# Patient Record
Sex: MALE | Race: WHITE | NOT HISPANIC OR LATINO | URBAN - METROPOLITAN AREA
[De-identification: names, ages, dates, MRNs, and addresses within clinical notes are randomized per-mention and may not be internally consistent; named-entity substitution may affect disease eponyms.]

---

## 2018-07-05 ENCOUNTER — EMERGENCY (EMERGENCY)
Facility: HOSPITAL | Age: 33
LOS: 1 days | Discharge: LEFT BEFORE DISCHARGE | End: 2018-07-05
Payer: MEDICAID

## 2018-07-05 VITALS
SYSTOLIC BLOOD PRESSURE: 155 MMHG | RESPIRATION RATE: 19 BRPM | OXYGEN SATURATION: 98 % | DIASTOLIC BLOOD PRESSURE: 67 MMHG | HEIGHT: 64 IN | WEIGHT: 179.9 LBS | TEMPERATURE: 99 F | HEART RATE: 89 BPM

## 2018-07-05 PROCEDURE — 93005 ELECTROCARDIOGRAM TRACING: CPT

## 2018-07-05 PROCEDURE — 93010 ELECTROCARDIOGRAM REPORT: CPT

## 2018-07-05 NOTE — ED ADULT TRIAGE NOTE - CHIEF COMPLAINT QUOTE
patient states that he is having a heart attack, palpitations chest pain, Drank "Redline extreme" energy drink quickly thinking it ws water, no cardiac HX

## 2022-02-26 ENCOUNTER — EMERGENCY (EMERGENCY)
Facility: HOSPITAL | Age: 37
LOS: 1 days | Discharge: HOME | End: 2022-02-26
Attending: EMERGENCY MEDICINE
Payer: MEDICAID

## 2022-02-26 VITALS
TEMPERATURE: 98 F | HEART RATE: 81 BPM | SYSTOLIC BLOOD PRESSURE: 139 MMHG | RESPIRATION RATE: 18 BRPM | HEIGHT: 64 IN | OXYGEN SATURATION: 99 % | DIASTOLIC BLOOD PRESSURE: 83 MMHG

## 2022-02-26 VITALS — TEMPERATURE: 98 F

## 2022-02-26 LAB
ALBUMIN SERPL ELPH-MCNC: 4.3 G/DL — SIGNIFICANT CHANGE UP (ref 3.3–5.2)
ALP SERPL-CCNC: 57 U/L — SIGNIFICANT CHANGE UP (ref 40–120)
ALT FLD-CCNC: 14 U/L — SIGNIFICANT CHANGE UP
ANION GAP SERPL CALC-SCNC: 14 MMOL/L — SIGNIFICANT CHANGE UP (ref 5–17)
AST SERPL-CCNC: 20 U/L — SIGNIFICANT CHANGE UP
BASOPHILS # BLD AUTO: 0.01 K/UL — SIGNIFICANT CHANGE UP (ref 0–0.2)
BASOPHILS NFR BLD AUTO: 0.2 % — SIGNIFICANT CHANGE UP (ref 0–2)
BILIRUB SERPL-MCNC: 1 MG/DL — SIGNIFICANT CHANGE UP (ref 0.4–2)
BUN SERPL-MCNC: 18.5 MG/DL — SIGNIFICANT CHANGE UP (ref 8–20)
CALCIUM SERPL-MCNC: 9.2 MG/DL — SIGNIFICANT CHANGE UP (ref 8.6–10.2)
CHLORIDE SERPL-SCNC: 100 MMOL/L — SIGNIFICANT CHANGE UP (ref 98–107)
CO2 SERPL-SCNC: 23 MMOL/L — SIGNIFICANT CHANGE UP (ref 22–29)
CREAT SERPL-MCNC: 0.91 MG/DL — SIGNIFICANT CHANGE UP (ref 0.5–1.3)
EOSINOPHIL # BLD AUTO: 0.12 K/UL — SIGNIFICANT CHANGE UP (ref 0–0.5)
EOSINOPHIL NFR BLD AUTO: 2.4 % — SIGNIFICANT CHANGE UP (ref 0–6)
GLUCOSE SERPL-MCNC: 113 MG/DL — HIGH (ref 70–99)
HCT VFR BLD CALC: 42.1 % — SIGNIFICANT CHANGE UP (ref 39–50)
HGB BLD-MCNC: 13.5 G/DL — SIGNIFICANT CHANGE UP (ref 13–17)
IMM GRANULOCYTES NFR BLD AUTO: 0.4 % — SIGNIFICANT CHANGE UP (ref 0–1.5)
LYMPHOCYTES # BLD AUTO: 1.69 K/UL — SIGNIFICANT CHANGE UP (ref 1–3.3)
LYMPHOCYTES # BLD AUTO: 33.8 % — SIGNIFICANT CHANGE UP (ref 13–44)
MCHC RBC-ENTMCNC: 25 PG — LOW (ref 27–34)
MCHC RBC-ENTMCNC: 32.1 GM/DL — SIGNIFICANT CHANGE UP (ref 32–36)
MCV RBC AUTO: 77.8 FL — LOW (ref 80–100)
MONOCYTES # BLD AUTO: 0.54 K/UL — SIGNIFICANT CHANGE UP (ref 0–0.9)
MONOCYTES NFR BLD AUTO: 10.8 % — SIGNIFICANT CHANGE UP (ref 2–14)
NEUTROPHILS # BLD AUTO: 2.62 K/UL — SIGNIFICANT CHANGE UP (ref 1.8–7.4)
NEUTROPHILS NFR BLD AUTO: 52.4 % — SIGNIFICANT CHANGE UP (ref 43–77)
PLATELET # BLD AUTO: 177 K/UL — SIGNIFICANT CHANGE UP (ref 150–400)
POTASSIUM SERPL-MCNC: 4.2 MMOL/L — SIGNIFICANT CHANGE UP (ref 3.5–5.3)
POTASSIUM SERPL-SCNC: 4.2 MMOL/L — SIGNIFICANT CHANGE UP (ref 3.5–5.3)
PROT SERPL-MCNC: 6.8 G/DL — SIGNIFICANT CHANGE UP (ref 6.6–8.7)
RAPID RVP RESULT: SIGNIFICANT CHANGE UP
RBC # BLD: 5.41 M/UL — SIGNIFICANT CHANGE UP (ref 4.2–5.8)
RBC # FLD: 13.8 % — SIGNIFICANT CHANGE UP (ref 10.3–14.5)
SARS-COV-2 RNA SPEC QL NAA+PROBE: SIGNIFICANT CHANGE UP
SODIUM SERPL-SCNC: 137 MMOL/L — SIGNIFICANT CHANGE UP (ref 135–145)
TSH SERPL-MCNC: 1.85 UIU/ML — SIGNIFICANT CHANGE UP (ref 0.27–4.2)
WBC # BLD: 5 K/UL — SIGNIFICANT CHANGE UP (ref 3.8–10.5)
WBC # FLD AUTO: 5 K/UL — SIGNIFICANT CHANGE UP (ref 3.8–10.5)

## 2022-02-26 PROCEDURE — 84443 ASSAY THYROID STIM HORMONE: CPT

## 2022-02-26 PROCEDURE — 85025 COMPLETE CBC W/AUTO DIFF WBC: CPT

## 2022-02-26 PROCEDURE — 99284 EMERGENCY DEPT VISIT MOD MDM: CPT

## 2022-02-26 PROCEDURE — 0225U NFCT DS DNA&RNA 21 SARSCOV2: CPT

## 2022-02-26 PROCEDURE — 71045 X-RAY EXAM CHEST 1 VIEW: CPT | Mod: 26

## 2022-02-26 PROCEDURE — 80053 COMPREHEN METABOLIC PANEL: CPT

## 2022-02-26 PROCEDURE — 99285 EMERGENCY DEPT VISIT HI MDM: CPT | Mod: 25

## 2022-02-26 PROCEDURE — 71045 X-RAY EXAM CHEST 1 VIEW: CPT

## 2022-02-26 PROCEDURE — 93010 ELECTROCARDIOGRAM REPORT: CPT

## 2022-02-26 PROCEDURE — 36415 COLL VENOUS BLD VENIPUNCTURE: CPT

## 2022-02-26 PROCEDURE — 93005 ELECTROCARDIOGRAM TRACING: CPT

## 2022-02-26 RX ORDER — HYDROXYZINE HCL 10 MG
1 TABLET ORAL
Qty: 10 | Refills: 0
Start: 2022-02-26 | End: 2022-03-07

## 2022-02-26 NOTE — ED PROVIDER NOTE - OBJECTIVE STATEMENT
Patient is a 35 yo male with no significant PMHx presenting from home with dry mouth, postnasal drip, anxiety, and palpitations. Patient states he has had a month of postnasal drip for which he has seen his PCP several times; he recently finished a course of Augmentin for his symptoms. Patient woke up at 3 am tonight with dry mouth, continued postnasal drip, and palpitations; he takes Ambien at night for sleep but only got 3 hours tonight which concerned him. Patient is feeling anxious about his continued postnasal drip which has not improved with abx or flonase. He denies any alcohol, smoking, or drug use. Denies SI/HI/hallucinations/delusions. Denies any PMHx, cardiac history, family history of early cardiac disease. Patient also states he feels cold and is actively shivering. He is vaccinated x2 and tested negative for COVID last month. Denies fevers, chills, dizziness, lightheadedness  dysphagia, dysarthria, diplopia, photophobia, syncope, cough, SOB, CP, abdominal pain, neck pain, back pain, diarrhea, dysuria, hematuria, hematochezia, hematemesis, n/v, recent travel, sick contacts, leg swelling.

## 2022-02-26 NOTE — ED PROVIDER NOTE - PROGRESS NOTE DETAILS
JK - cbc, cmp, tsh, CXR, ECG all WNL. Patient stable, resting comfortably, VSS in no apparent distress. Appears calm at this time. Hydroxizine scripts sent to pharmacy for anxiety, insomnia; instructed to follow up with his PCP for anxiety if symptoms continue. Patient ready and agreeable to DC home with close outpatient ENT follow up.

## 2022-02-26 NOTE — ED ADULT NURSE NOTE - OBJECTIVE STATEMENT
Pt reports to the ED for palpitations, anxiety, post nasal drip and dry mouth. PT states he only slept for a few hours after taking Ambien. Pt is in no distress. Pt denies chest pain/disomfort at this time. Pt resting comfortably. Pt states he completed ABX but still has post nasal drip. VSS

## 2022-02-26 NOTE — ED PROVIDER NOTE - ATTENDING CONTRIBUTION TO CARE
37 yo M with no PMH presents to ED feeling anxious with assoc palpitations.  Pt has had, dry mouth and post-nasal drip x last 1 1/2 months.  Pt has been treated with Abx x 2 by PMD and then was concerned about having thrush. No improvement with Flonase.  Pt started on Ambien because of being unable to sleep, but still unable to sleep.  On exam pt awake and alert, anxious appearing, NSR on monitor, PERRL, throat clear mm dry, no oral lesions, Neck supple, Cor Reg, Lungs clear b/l, abd soft, NT, Ext FROM, Neuro non-focal, Will check labs, EKG, RVP, CXR and refer to ENT as outpt if ED w/u neg  Rx Vistaril for sleep

## 2022-02-26 NOTE — ED PROVIDER NOTE - CARDIAC, MLM
Normal rate, regular rhythm.  Heart sounds S1, S2.  No murmurs, rubs or gallops. 2+ peripheral pulses, good capillary refill, no peripheral edema

## 2022-02-26 NOTE — ED ADULT NURSE REASSESSMENT NOTE - NS ED NURSE REASSESS COMMENT FT1
Pt DC by MD. Pt in no distress. PT ambulating to the bathroom without difficulty. VSS. PT ambulated out of the ED. PT has all paperwork.

## 2022-02-26 NOTE — ED PROVIDER NOTE - NSFOLLOWUPINSTRUCTIONS_ED_ALL_ED_FT
WHAT YOU NEED TO KNOW:    Postnasal drip is a condition that causes a large amount of mucus to collect in your throat or nose. It may also be called upper airway cough syndrome because the mucus causes repeated coughing. You may have a sore throat, or throat tissues may swell. This may feel like a lump in your throat. You may also feel like you need to clear your throat often.    DISCHARGE INSTRUCTIONS:    Contact your healthcare provider if:   •You have trouble breathing because of the mucus.      •You have new or worsening symptoms, even with treatment.      •You have signs of an infection, such as yellow or green mucus, or a fever.      •You have questions or concerns about your condition or care.      Medicines:   •Medicines may be given to thin the mucus. You may need to swallow the medicine or use a device to flush your sinuses with liquid squirted into your nose. Nasal sprays may also be needed to keep the tissues in your nose moist. Medicines can also relieve congestion. Allergy medicine may help if your symptoms are caused by seasonal allergies, such as hay fever. You may need medicine to help control GERD.      •Antibiotics may be needed to treat a bacterial infection.      •Take your medicine as directed. Contact your healthcare provider if you think your medicine is not helping or if you have side effects. Tell him or her if you are allergic to any medicine. Keep a list of the medicines, vitamins, and herbs you take. Include the amounts, and when and why you take them. Bring the list or the pill bottles to follow-up visits. Carry your medicine list with you in case of an emergency.      Manage postnasal drip:   •Use a humidifier or vaporizer. Use a cool mist humidifier or a vaporizer to increase air moisture in your home. This may make it easier for you to breathe.       •Drink more liquids as directed. Liquids help keep your air passages moist and help you cough up mucus. Ask how much liquid to drink each day and which liquids are best for you.       •Avoid cold air and dry, heated air. Cold or dry air can trigger postnasal drip. Try to stay inside on cold days, or keep your mouth covered. Do not stay long in areas that have dry, heated air.      •Do not smoke, and avoid secondhand smoke. Nicotine and other chemicals in cigarettes and cigars can irritate your throat and make coughing worse. Ask your healthcare provider for information if you currently smoke and need help to quit. E-cigarettes or smokeless tobacco still contain nicotine. Talk to your healthcare provider before you use these products.       Follow up with your doctor as directed: Write down your questions so you remember to ask them during your visits.

## 2022-02-26 NOTE — ED PROVIDER NOTE - CARE PROVIDER_API CALL
Lam Wagner)  Otolaryngology  [address] [place] [address]  Phone: [phone]  Fax: [phone]  Follow Up Time:

## 2022-02-26 NOTE — ED PROVIDER NOTE - CLINICAL SUMMARY MEDICAL DECISION MAKING FREE TEXT BOX
Patient is a 35 yo male with no significant PMHx presenting from home with dry mouth, postnasal drip, anxiety, and palpitations. Patient states he has had a month of postnasal drip for which he has seen his PCP several times; he recently finished a course of Augmentin for his symptoms. Patient woke up at 3 am tonight with dry mouth, continued postnasal drip, and palpitations. Patient is feeling anxious about his continued postnasal drip which has not improved with abx or flonase. Patient also states he feels cold and is actively shivering. He is vaccinated x2. CBC, CMP, TSH to r/o electrolyte abnormalities and thyroid conditions. ECG WNL. RVP, CXR to r/o URI vs. PNA. Will continue to monitor.

## 2022-02-26 NOTE — ED ADULT TRIAGE NOTE - CHIEF COMPLAINT QUOTE
Pt. complaining of palpitations, dry mouth and anxiety that started tonight. Pt. anxious and shaky in triage

## 2022-02-26 NOTE — ED PROVIDER NOTE - PATIENT PORTAL LINK FT
You can access the FollowMyHealth Patient Portal offered by [facility] by registering at the following website: [email or web address]. By joining [facility]’s FollowMyHealth portal, you will also be able to view your health information using other applications (apps) compatible with our system.

## 2022-02-26 NOTE — ED PROVIDER NOTE - CONSTITUTIONAL, MLM
normal... Patient appears visibly anxious, tearful, awake, alert, oriented to person, place, time/situation and in no apparent distress.

## 2022-08-17 PROBLEM — Z00.00 ENCOUNTER FOR PREVENTIVE HEALTH EXAMINATION: Status: ACTIVE | Noted: 2022-08-17

## 2022-09-02 DIAGNOSIS — Z87.898 PERSONAL HISTORY OF OTHER SPECIFIED CONDITIONS: ICD-10-CM

## 2022-09-02 DIAGNOSIS — Z86.59 PERSONAL HISTORY OF OTHER MENTAL AND BEHAVIORAL DISORDERS: ICD-10-CM

## 2022-09-06 ENCOUNTER — APPOINTMENT (OUTPATIENT)
Dept: CARDIOLOGY | Facility: CLINIC | Age: 37
End: 2022-09-06

## 2023-07-06 ENCOUNTER — OFFICE (OUTPATIENT)
Dept: URBAN - METROPOLITAN AREA CLINIC 12 | Facility: CLINIC | Age: 38
Setting detail: OPHTHALMOLOGY
End: 2023-07-06
Payer: MEDICAID

## 2023-07-06 DIAGNOSIS — H00.14: ICD-10-CM

## 2023-07-06 DIAGNOSIS — H16.223: ICD-10-CM

## 2023-07-06 PROCEDURE — 99213 OFFICE O/P EST LOW 20 MIN: CPT | Performed by: OPHTHALMOLOGY

## 2023-07-06 ASSESSMENT — REFRACTION_MANIFEST
OS_VA1: 20/20
OD_VA1: 20/20
OD_CYLINDER: -0.75
OD_SPHERE: -5.00
OD_CYLINDER: -0.75
OS_SPHERE: -6.50
OD_SPHERE: -5.00
OS_AXIS: 180
OS_SPHERE: -6.25
OD_VA1: 20/20-1
OD_AXIS: 007
OS_VA1: 20/20-1
OD_AXIS: 001
OS_AXIS: 000
OS_CYLINDER: -0.25
OU_VA: 20/20
OS_CYLINDER: SPHERE

## 2023-07-06 ASSESSMENT — SUPERFICIAL PUNCTATE KERATITIS (SPK)
OS_SPK: ABSENT
OD_SPK: ABSENT

## 2023-07-06 ASSESSMENT — REFRACTION_CURRENTRX
OD_VPRISM_DIRECTION: SV
OD_CYLINDER: -0.50
OD_AXIS: 008
OS_AXIS: 000
OS_SPHERE: -6.50
OS_VPRISM_DIRECTION: SV
OS_OVR_VA: 20/
OS_CYLINDER: SPHERE
OD_SPHERE: -5.75
OD_OVR_VA: 20/

## 2023-07-06 ASSESSMENT — VISUAL ACUITY
OS_BCVA: 20/20-
OD_BCVA: 20/25-2

## 2023-07-06 ASSESSMENT — CONFRONTATIONAL VISUAL FIELD TEST (CVF)
OS_FINDINGS: FULL
OD_FINDINGS: FULL

## 2023-07-06 ASSESSMENT — SPHEQUIV_DERIVED
OS_SPHEQUIV: -6.375
OD_SPHEQUIV: -5.375
OD_SPHEQUIV: -5.375

## 2023-07-11 ENCOUNTER — OFFICE (OUTPATIENT)
Dept: URBAN - METROPOLITAN AREA CLINIC 12 | Facility: CLINIC | Age: 38
Setting detail: OPHTHALMOLOGY
End: 2023-07-11
Payer: COMMERCIAL
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
## 2023-07-11 PROCEDURE — BRUDER EYE BRUDER EYE PADS: Performed by: OPHTHALMOLOGY

## 2024-03-06 ENCOUNTER — OFFICE (OUTPATIENT)
Dept: URBAN - METROPOLITAN AREA CLINIC 12 | Facility: CLINIC | Age: 39
Setting detail: OPHTHALMOLOGY
End: 2024-03-06
Payer: MEDICAID

## 2024-03-06 DIAGNOSIS — H16.223: ICD-10-CM

## 2024-03-06 PROCEDURE — 92014 COMPRE OPH EXAM EST PT 1/>: CPT | Performed by: OPHTHALMOLOGY

## 2024-03-06 ASSESSMENT — REFRACTION_CURRENTRX
OS_OVR_VA: 20/
OD_AXIS: 008
OS_SPHERE: -6.50
OD_CYLINDER: -0.50
OS_CYLINDER: SPHERE
OD_SPHERE: -5.75
OS_VPRISM_DIRECTION: SV
OS_AXIS: 000
OD_VPRISM_DIRECTION: SV
OD_OVR_VA: 20/

## 2024-03-06 ASSESSMENT — REFRACTION_MANIFEST
OD_SPHERE: -5.00
OD_CYLINDER: -0.75
OD_SPHERE: -5.00
OS_VA1: 20/20-1
OS_CYLINDER: SPHERE
OD_VA1: 20/20-1
OU_VA: 20/20
OD_VA1: 20/20
OS_VA1: 20/20
OS_AXIS: 180
OS_SPHERE: -6.50
OD_AXIS: 007
OD_AXIS: 001
OS_SPHERE: -6.25
OS_CYLINDER: -0.25
OS_AXIS: 000
OD_CYLINDER: -0.75

## 2024-03-06 ASSESSMENT — SPHEQUIV_DERIVED
OD_SPHEQUIV: -5.375
OD_SPHEQUIV: -5.375
OS_SPHEQUIV: -6.375

## 2025-02-06 ENCOUNTER — OFFICE (OUTPATIENT)
Dept: URBAN - METROPOLITAN AREA CLINIC 100 | Facility: CLINIC | Age: 40
Setting detail: OPHTHALMOLOGY
End: 2025-02-06
Payer: MEDICAID

## 2025-02-06 DIAGNOSIS — H00.14: ICD-10-CM

## 2025-02-06 DIAGNOSIS — H01.004: ICD-10-CM

## 2025-02-06 DIAGNOSIS — H00.11: ICD-10-CM

## 2025-02-06 DIAGNOSIS — H01.001: ICD-10-CM

## 2025-02-06 PROCEDURE — 92285 EXTERNAL OCULAR PHOTOGRAPHY: CPT | Performed by: OPHTHALMOLOGY

## 2025-02-06 PROCEDURE — 92012 INTRM OPH EXAM EST PATIENT: CPT | Performed by: OPHTHALMOLOGY

## 2025-02-06 ASSESSMENT — REFRACTION_AUTOREFRACTION
OD_SPHERE: +0.25
OS_SPHERE: +0.50
OD_AXIS: 040
OD_CYLINDER: -0.25
OS_AXIS: 077
OS_CYLINDER: -0.75

## 2025-02-06 ASSESSMENT — CONFRONTATIONAL VISUAL FIELD TEST (CVF)
OS_FINDINGS: FULL
OD_FINDINGS: FULL

## 2025-02-06 ASSESSMENT — KERATOMETRY
OD_AXISANGLE_DEGREES: 089
OD_K1POWER_DIOPTERS: 40.50
OS_K1POWER_DIOPTERS: 39.75
OD_K2POWER_DIOPTERS: 40.75
OS_K2POWER_DIOPTERS: 40.50
OS_AXISANGLE_DEGREES: 152

## 2025-02-06 ASSESSMENT — VISUAL ACUITY
OD_BCVA: 20/20
OS_BCVA: 20/20

## 2025-02-06 ASSESSMENT — SUPERFICIAL PUNCTATE KERATITIS (SPK)
OD_SPK: ABSENT
OS_SPK: ABSENT

## 2025-02-06 ASSESSMENT — LID EXAM ASSESSMENTS
OS_BLEPHARITIS: 2+
OD_BLEPHARITIS: 2+

## 2025-02-24 ENCOUNTER — RX ONLY (RX ONLY)
Age: 40
End: 2025-02-24

## 2025-02-24 ENCOUNTER — TELEHEALTH-OTHER THEN HOME (OUTPATIENT)
Dept: URBAN - METROPOLITAN AREA CLINIC 44 | Facility: CLINIC | Age: 40
Setting detail: OPHTHALMOLOGY
End: 2025-02-24
Payer: MEDICAID

## 2025-02-24 DIAGNOSIS — H00.11: ICD-10-CM

## 2025-02-24 DIAGNOSIS — G24.5: ICD-10-CM

## 2025-02-24 DIAGNOSIS — H01.004: ICD-10-CM

## 2025-02-24 DIAGNOSIS — H00.14: ICD-10-CM

## 2025-02-24 DIAGNOSIS — H01.001: ICD-10-CM

## 2025-02-24 PROCEDURE — 92012 INTRM OPH EXAM EST PATIENT: CPT | Performed by: OPHTHALMOLOGY

## 2025-02-24 ASSESSMENT — KERATOMETRY
OD_K2POWER_DIOPTERS: 40.75
OD_K1POWER_DIOPTERS: 40.50
OS_AXISANGLE_DEGREES: 152
OD_AXISANGLE_DEGREES: 089
OS_K1POWER_DIOPTERS: 39.75
OS_K2POWER_DIOPTERS: 40.50

## 2025-02-24 ASSESSMENT — VISUAL ACUITY
OS_BCVA: 20/20
OD_BCVA: 20/20

## 2025-02-24 ASSESSMENT — REFRACTION_AUTOREFRACTION
OS_SPHERE: +0.50
OS_CYLINDER: -0.75
OS_AXIS: 077
OD_AXIS: 040
OD_CYLINDER: -0.25
OD_SPHERE: +0.25

## 2025-02-24 ASSESSMENT — LID EXAM ASSESSMENTS
OD_BLEPHARITIS: 2+
OS_BLEPHARITIS: 2+

## 2025-02-24 ASSESSMENT — CONFRONTATIONAL VISUAL FIELD TEST (CVF)
OS_FINDINGS: FULL
OD_FINDINGS: FULL

## 2025-02-24 ASSESSMENT — SUPERFICIAL PUNCTATE KERATITIS (SPK)
OS_SPK: ABSENT
OD_SPK: ABSENT